# Patient Record
Sex: FEMALE | Race: OTHER | HISPANIC OR LATINO | Employment: FULL TIME | URBAN - METROPOLITAN AREA
[De-identification: names, ages, dates, MRNs, and addresses within clinical notes are randomized per-mention and may not be internally consistent; named-entity substitution may affect disease eponyms.]

---

## 2023-02-26 PROBLEM — N60.11: Status: ACTIVE | Noted: 2023-02-26

## 2023-02-26 PROBLEM — N63.25 BREAST LUMP ON LEFT SIDE AT 12 O'CLOCK POSITION: Status: ACTIVE | Noted: 2023-02-26

## 2023-02-26 PROBLEM — N63.10 MASS OF MULTIPLE SITES OF RIGHT BREAST: Status: ACTIVE | Noted: 2023-02-26

## 2023-02-26 PROBLEM — N60.12 DIFFUSE CYSTIC MASTOPATHY OF LEFT BREAST: Status: ACTIVE | Noted: 2023-02-26

## 2024-01-10 ENCOUNTER — TELEPHONE (OUTPATIENT)
Dept: SURGERY | Facility: CLINIC | Age: 64
End: 2024-01-10
Payer: COMMERCIAL

## 2024-01-10 PROBLEM — N60.11 DIFFUSE CYSTIC MASTOPATHY OF BOTH BREASTS: Status: ACTIVE | Noted: 2024-01-10

## 2024-01-10 PROBLEM — N60.12 DIFFUSE CYSTIC MASTOPATHY OF BOTH BREASTS: Status: ACTIVE | Noted: 2024-01-10

## 2024-01-10 NOTE — TELEPHONE ENCOUNTER
I spoke to pt. And instructed her to come at 1 p.m. instead of 2 since she's going to need an US. She will see me first then I'll send her for imaging. She understands and agrees with this plan

## 2024-01-11 ENCOUNTER — OFFICE VISIT (OUTPATIENT)
Dept: SURGERY | Facility: CLINIC | Age: 64
End: 2024-01-11
Payer: COMMERCIAL

## 2024-01-11 DIAGNOSIS — N63.25 BREAST LUMP ON LEFT SIDE AT 9 O'CLOCK POSITION: ICD-10-CM

## 2024-01-11 DIAGNOSIS — N63.25 BREAST LUMP ON LEFT SIDE AT 3 O'CLOCK POSITION: Primary | ICD-10-CM

## 2024-01-11 DIAGNOSIS — N63.25 BREAST LUMP ON LEFT SIDE AT 12 O'CLOCK POSITION: ICD-10-CM

## 2024-01-11 DIAGNOSIS — N60.12 DIFFUSE CYSTIC MASTOPATHY OF BOTH BREASTS: ICD-10-CM

## 2024-01-11 DIAGNOSIS — N60.11 DIFFUSE CYSTIC MASTOPATHY OF BOTH BREASTS: ICD-10-CM

## 2024-01-11 PROCEDURE — 1160F RVW MEDS BY RX/DR IN RCRD: CPT | Mod: CPTII,S$GLB,, | Performed by: NURSE PRACTITIONER

## 2024-01-11 PROCEDURE — 99999 PR PBB SHADOW E&M-EST. PATIENT-LVL III: CPT | Mod: PBBFAC,,, | Performed by: NURSE PRACTITIONER

## 2024-01-11 PROCEDURE — 99213 OFFICE O/P EST LOW 20 MIN: CPT | Mod: S$GLB,,, | Performed by: NURSE PRACTITIONER

## 2024-01-11 PROCEDURE — 1159F MED LIST DOCD IN RCRD: CPT | Mod: CPTII,S$GLB,, | Performed by: NURSE PRACTITIONER

## 2024-01-11 NOTE — PROGRESS NOTES
[facility]  MD Rich Galvan, NP-C    Date of Service: 2024      Chief Complaint:   Sylvia Verdin is a 63 y.o. female presenting today for  a new tender left breast mass that she first noticed a few weeks ago. She was last seen 2022 but her last mammogram was done 2023 with Dr. Cooper and it showed NEM    History of Present Illness:   Denies : Skin Changes. Denies : Discharge.   Mrs. Verdin presents on 2019 after a right breast mass was noticed. Her imaging was consistent with a benign simple cyst. She did not follow up after her initial visit until she presents back on 2022 after noticing a new left breast mass. Her 2021 Screening Mammogram showed NEM with a stable left breast cyst. MD:::Edson Cooper MD    Past Medical History:   Diagnosis Date    Abnormal mammogram     Anxiety     Breast lump on left side at 12 o'clock position 2023    Breast lump on left side at 3 o'clock position 2024    Breast lump on left side at 9 o'clock position 2024    Diffuse cystic mastopathy of both breasts 01/10/2024    Diffuse cystic mastopathy of breast, right 2023    Diffuse cystic mastopathy of left breast 2023    History of abnormal cervical Pap smear     Mass of multiple sites of right breast 2023      Past Surgical History:   Procedure Laterality Date     SECTION       SECTION      COLONOSCOPY      LAVH LS&O Left 10/17/2010    RS&O  1993        Current Outpatient Medications:     buPROPion (WELLBUTRIN XL) 300 MG 24 hr tablet, Take 300 mg by mouth., Disp: , Rfl:     estradioL (ESTRACE) 2 MG tablet, Take 1 tablet (2 mg total) by mouth once daily., Disp: 90 tablet, Rfl: 3   Review of patient's allergies indicates:   Allergen Reactions    Levofloxacin       Social History     Tobacco Use    Smoking status: Former     Types: Cigarettes    Smokeless tobacco:  Never   Substance Use Topics    Alcohol use: Not Currently      Family History   Problem Relation Age of Onset    Hypertension Mother     Breast cancer Sister         Review of Systems   Integumentary:  Positive for breast mass. Negative for color change, rash, mole/lesion, breast discharge and breast tenderness.   Breast: Positive for mass.Negative for tenderness       Physical Exam   HENT:   Head: Normocephalic.   Pulmonary/Chest: Right breast exhibits no inverted nipple, no mass, no nipple discharge, no skin change and no tenderness. Left breast exhibits mass (smooth nodule noted in the 12 o'clock and  3 o'clcock  and 9 o'clockposition). Left breast exhibits no inverted nipple, no nipple discharge, no skin change and no tenderness. No breast swelling.   Genitourinary: No breast swelling.   Musculoskeletal: Lymphadenopathy:      Upper Body:      Right upper body: No supraclavicular or axillary adenopathy.      Left upper body: No supraclavicular or axillary adenopathy.     Neurological: She is alert.      Mammogram 11/2/2023- The breasts are heterogeneously dense, which may obscure small masses. There is no evidence of suspicious masses, calcifications, or other abnormal findings. Impression: Bilateral There is no mammographic evidence of malignancy.    Ultrasound: Left- Benign breast cysts at the 3:00 and 9:00 positions measuring 1.6 cm and 1.1 cm corresponding to the palpable area of concern, considered benign. There is no sonographic evidence of malignancy in the left breast.       Assessment/Plan  1. Breast lump on left side at 3 o'clock position  Assessment & Plan:  Her imaging and exams are consistent with a benign simple cyst with NEM. We discussed our FCM protocol in detail. She's comfortable being followed conservatively. She understands the importance of monthly self-breast exams and knows to notify me of any and all changes as they occur.     Orders:  -     Cancel: US Breast Left Limited; Future;  Expected date: 01/11/2024    2. Breast lump on left side at 9 o'clock position  Assessment & Plan:  This area is also consistent with a benign simple cyst and requires no further work up.       3. Breast lump on left side at 12 o'clock position  Assessment & Plan:  She understands that her imaging and exams have remained stable and is comfortable being followed in a conservative fashion. She understands the importance of monthly self-breast examination and knows to report any and all changes as they occur.      4. Diffuse cystic mastopathy of both breasts  Assessment & Plan:  We discussed our Fibrocystic Mastopathy Protocol in detail. She should take Vitamin E 800 IU everyday x 3 months or until non-tender then can stop Vitamin E vs. continue daily at 400 IU.  The use of ice packs or warms soaks to tender area of the breast may also be of some benefit.  If warm soaks help her tenderness - She can use Aspercreme (unless allergic to Aspirin) on the affected area.  Ibuprofen (if no contraindications) at 800 mg three times per day for 5 days can also relieve many symptoms associated with swollen or inflamed tissue.  She can repeat Ibuprofen for 5 days, but then should be off for 5 days as it may cause gastric upset.  It is a good idea to wear a tight bra during the day and night to minimize movement of the tender area (Sports Bras work well).  Evening Primrose Oil can be bought over the counter and used at a dose of 3000 mg per day to help with any breast pain/tenderness not improved by implementing the above measures.               Medical Decision Making:  It is my impression that this patient suffers all conditions contained in this medical document.  Each of these conditions did affect our plan of care and my medical decision making today.  It is my opinion that the medical decision making concerning this patient was of moderate difficulty based on the aforementioned conditions.  Any further recommendations will be  communicated to the patient by me.  I have reviewed and verified her allergies, list of medications, medical and surgical histories, social history, and a pertinent review of symptoms.      Follow up:  6 months and prn    For:  Physical Examination

## 2024-01-11 NOTE — ASSESSMENT & PLAN NOTE
She understands that her imaging and exams have remained stable and is comfortable being followed in a conservative fashion. She understands the importance of monthly self-breast examination and knows to report any and all changes as they occur.

## 2024-01-11 NOTE — ASSESSMENT & PLAN NOTE
Her imaging and exams are consistent with a benign simple cyst with NEM. We discussed our FCM protocol in detail. She's comfortable being followed conservatively. She understands the importance of monthly self-breast exams and knows to notify me of any and all changes as they occur.

## 2024-07-10 NOTE — PROGRESS NOTES
[facility]  MD Rich Galvan, NP-C    Date of Service: 2024      Chief Complaint:   Sylvia Verdin is a 64 y.o. female presenting today for  6 month follow up. She is due for Physical Examination  She reports no interval changes on her self-breast examination.  Her pain has improved.    History of Present Illness:   Mrs. Verdin  first presented on 2019 after a right breast mass was noticed. Her imaging was consistent with a benign simple cyst. She did not follow up after her initial visit until she presented back on 2022 after noticing a new left breast mass. Her 2021 Screening Mammogram showed NEM with a stable left breast cyst. MD:::Edson Cooper MD     Past Medical History:   Diagnosis Date    Abnormal mammogram     Anxiety     Breast lump on left side at 12 o'clock position 2023    Breast lump on left side at 3 o'clock position 2024    Breast lump on left side at 9 o'clock position 2024    Diffuse cystic mastopathy of both breasts 01/10/2024    Diffuse cystic mastopathy of breast, right 2023    Diffuse cystic mastopathy of left breast 2023    History of abnormal cervical Pap smear     Mass of multiple sites of right breast 2023      Past Surgical History:   Procedure Laterality Date     SECTION       SECTION  1986    COLONOSCOPY      LAVH LS&O Left 10/17/2010    RS&O  1993        Current Outpatient Medications:     estradioL (ESTRACE) 2 MG tablet, Take 1 tablet (2 mg total) by mouth once daily., Disp: 90 tablet, Rfl: 3    buPROPion (WELLBUTRIN XL) 300 MG 24 hr tablet, Take 300 mg by mouth. (Patient not taking: Reported on 2024), Disp: , Rfl:    Review of patient's allergies indicates:   Allergen Reactions    Levofloxacin       Social History     Tobacco Use    Smoking status: Former     Types: Cigarettes    Smokeless tobacco: Never   Substance Use  Topics    Alcohol use: Not Currently      Family History   Problem Relation Name Age of Onset    Hypertension Mother      Breast cancer Sister  62    Ovarian cancer Neg Hx          Review of Systems   Integumentary:  Negative for color change, rash, mole/lesion, breast mass, breast discharge and breast tenderness.   Breast: Negative for mass and tenderness       Physical Exam   HENT:   Head: Normocephalic.   Pulmonary/Chest: Right breast exhibits no inverted nipple, no mass, no nipple discharge, no skin change and no tenderness. Left breast exhibits mass ((smooth nodule noted in the 12 o'clock and  3 o'clcock  and 9 o'clockposition). Left breast exhibits no inverted nipple, no nipple discharge, no skin change and no tenderness. No breast swelling.   Genitourinary: No breast swelling.   Musculoskeletal: Lymphadenopathy:      Upper Body:      Right upper body: No supraclavicular or axillary adenopathy.      Left upper body: No supraclavicular or axillary adenopathy.     Neurological: She is alert.          Assessment/Plan  1. Breast lump on left side at 3 o'clock position  Assessment & Plan:  We reviewed our findings today and her questions were answered.  She understands that her exams have remained stable (and show nothing concerning).  She is comfortable being followed in a conservative fashion.      She understands the importance of monthly self-breast examination and knows to report any and all changes as they occur.    NOTE:::We viewed her films together at today's visit.  We discussed the multiple views obtained and the important findings.  Even benign changes were mentioned and her questions were answered.  She knows that she may receive a formal letter or report from the Radiologist.  She is to contact us if she has questions.         2. Breast lump on left side at 12 o'clock position  Assessment & Plan:  Same as above      3. Breast lump on left side at 9 o'clock position  Assessment & Plan:  Her exams have  remained stable.      4. Diffuse cystic mastopathy of both breasts  Assessment & Plan:  We discussed our Fibrocystic Mastopathy Protocol in detail. She should take Vitamin E 800 IU everyday x 3 months or until non-tender then can stop Vitamin E vs. continue daily at 400 IU.  The use of ice packs or warms soaks to tender area of the breast may also be of some benefit.  If warm soaks help her tenderness - She can use Aspercreme (unless allergic to Aspirin) on the affected area.  Ibuprofen (if no contraindications) at 800 mg three times per day for 5 days can also relieve many symptoms associated with swollen or inflamed tissue.  She can repeat Ibuprofen for 5 days, but then should be off for 5 days as it may cause gastric upset.  It is a good idea to wear a tight bra during the day and night to minimize movement of the tender area (Sports Bras work well).  Evening Primrose Oil can be bought over the counter and used at a dose of 3000 mg per day to help with any breast pain/tenderness not improved by implementing the above measures.        5. Mass of multiple sites of right breast  Assessment & Plan:  Her exams have remained stable.              Medical Decision Making:  It is my impression that this patient suffers all conditions contained in this medical document.  Each of these conditions did affect our plan of care and my medical decision making today.  It is my opinion that the medical decision making concerning this patient was of moderate difficulty based on the aforementioned conditions.  Any further recommendations will be communicated to the patient by me.  I have reviewed and verified her allergies, list of medications, medical and surgical histories, social history, and a pertinent review of symptoms.      Follow up:  November 2024 and PRN    For:  Physical Examination and MGM (D) at [facility]

## 2024-07-10 NOTE — ASSESSMENT & PLAN NOTE
We reviewed our findings today and her questions were answered.  She understands that her exams have remained stable (and show nothing concerning).  She is comfortable being followed in a conservative fashion.      She understands the importance of monthly self-breast examination and knows to report any and all changes as they occur.    NOTE:::We viewed her films together at today's visit.  We discussed the multiple views obtained and the important findings.  Even benign changes were mentioned and her questions were answered.  She knows that she may receive a formal letter or report from the Radiologist.  She is to contact us if she has questions.

## 2024-07-11 ENCOUNTER — OFFICE VISIT (OUTPATIENT)
Dept: SURGERY | Facility: CLINIC | Age: 64
End: 2024-07-11
Payer: COMMERCIAL

## 2024-07-11 VITALS — BODY MASS INDEX: 24.99 KG/M2 | HEIGHT: 65 IN | WEIGHT: 150 LBS

## 2024-07-11 DIAGNOSIS — N60.11 DIFFUSE CYSTIC MASTOPATHY OF BOTH BREASTS: ICD-10-CM

## 2024-07-11 DIAGNOSIS — N63.10 MASS OF MULTIPLE SITES OF RIGHT BREAST: ICD-10-CM

## 2024-07-11 DIAGNOSIS — N63.25 BREAST LUMP ON LEFT SIDE AT 9 O'CLOCK POSITION: ICD-10-CM

## 2024-07-11 DIAGNOSIS — N60.12 DIFFUSE CYSTIC MASTOPATHY OF BOTH BREASTS: ICD-10-CM

## 2024-07-11 DIAGNOSIS — N63.25 BREAST LUMP ON LEFT SIDE AT 12 O'CLOCK POSITION: ICD-10-CM

## 2024-07-11 DIAGNOSIS — N63.25 BREAST LUMP ON LEFT SIDE AT 3 O'CLOCK POSITION: Primary | ICD-10-CM

## 2024-07-11 PROCEDURE — 1159F MED LIST DOCD IN RCRD: CPT | Mod: CPTII,S$GLB,, | Performed by: NURSE PRACTITIONER

## 2024-07-11 PROCEDURE — 99999 PR PBB SHADOW E&M-EST. PATIENT-LVL III: CPT | Mod: PBBFAC,,, | Performed by: NURSE PRACTITIONER

## 2024-07-11 PROCEDURE — 3008F BODY MASS INDEX DOCD: CPT | Mod: CPTII,S$GLB,, | Performed by: NURSE PRACTITIONER

## 2024-07-11 PROCEDURE — 99213 OFFICE O/P EST LOW 20 MIN: CPT | Mod: S$GLB,,, | Performed by: NURSE PRACTITIONER

## 2024-07-11 PROCEDURE — 1160F RVW MEDS BY RX/DR IN RCRD: CPT | Mod: CPTII,S$GLB,, | Performed by: NURSE PRACTITIONER

## 2024-11-19 ENCOUNTER — PATIENT MESSAGE (OUTPATIENT)
Dept: NEUROLOGY | Facility: CLINIC | Age: 64
End: 2024-11-19
Payer: COMMERCIAL

## 2024-12-13 ENCOUNTER — TELEPHONE (OUTPATIENT)
Dept: SURGERY | Facility: CLINIC | Age: 64
End: 2024-12-13
Payer: COMMERCIAL

## 2024-12-13 NOTE — TELEPHONE ENCOUNTER
LVM for Pt informing her that the location for her upcoming appt on 1/14/2025 with NP Rich Cook will be changing to the [facility] location due to the retiring of Dr. Hood. Pt may keep her appt same time same day just new location. Left call back number to our department.

## 2025-01-02 DIAGNOSIS — N63.20 MASS OF MULTIPLE SITES OF LEFT BREAST: ICD-10-CM

## 2025-01-02 DIAGNOSIS — N63.10 MASS OF MULTIPLE SITES OF RIGHT BREAST: Primary | ICD-10-CM
